# Patient Record
Sex: MALE | Race: WHITE | URBAN - METROPOLITAN AREA
[De-identification: names, ages, dates, MRNs, and addresses within clinical notes are randomized per-mention and may not be internally consistent; named-entity substitution may affect disease eponyms.]

---

## 2023-02-13 ENCOUNTER — EMERGENCY (EMERGENCY)
Facility: HOSPITAL | Age: 28
LOS: 0 days | Discharge: HOME | End: 2023-02-13
Attending: EMERGENCY MEDICINE
Payer: COMMERCIAL

## 2023-02-13 VITALS
TEMPERATURE: 100 F | HEART RATE: 101 BPM | OXYGEN SATURATION: 96 % | RESPIRATION RATE: 17 BRPM | DIASTOLIC BLOOD PRESSURE: 91 MMHG | SYSTOLIC BLOOD PRESSURE: 124 MMHG

## 2023-02-13 VITALS — WEIGHT: 154.98 LBS | HEIGHT: 70 IN

## 2023-02-13 DIAGNOSIS — S02.632A: ICD-10-CM

## 2023-02-13 DIAGNOSIS — S32.502A UNSPECIFIED FRACTURE OF LEFT PUBIS, INITIAL ENCOUNTER FOR CLOSED FRACTURE: ICD-10-CM
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
## 2023-02-13 DIAGNOSIS — R68.84 JAW PAIN: ICD-10-CM

## 2023-02-13 PROCEDURE — 99284 EMERGENCY DEPT VISIT MOD MDM: CPT

## 2023-02-13 PROCEDURE — 99284 EMERGENCY DEPT VISIT MOD MDM: CPT | Mod: 25

## 2023-02-13 PROCEDURE — G1004: CPT

## 2023-02-13 PROCEDURE — 76376 3D RENDER W/INTRP POSTPROCES: CPT | Mod: 26

## 2023-02-13 PROCEDURE — 70486 CT MAXILLOFACIAL W/O DYE: CPT | Mod: MG

## 2023-02-13 PROCEDURE — 76376 3D RENDER W/INTRP POSTPROCES: CPT

## 2023-02-13 PROCEDURE — 70486 CT MAXILLOFACIAL W/O DYE: CPT | Mod: 26,MG
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
## 2023-02-13 NOTE — ED STATDOCS - ATTENDING APP SHARED VISIT CONTRIBUTION OF CARE
I personally saw the patient with the JASWINDER, and completed the key components of the history and physical exam. I then discussed the management plan with the JASWINDER.

## 2023-02-13 NOTE — ED ADULT NURSE NOTE - HOW OFTEN DO YOU HAVE A DRINK CONTAINING ALCOHOL?
PT came to [place] location to receive the MENINGOCOCCAL CONJUGATE MCV4O VACC  But we are currently out of stock. Advise and follow up with PT.   Monthly or less
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
## 2023-02-13 NOTE — ED STATDOCS - PATIENT PORTAL LINK FT
You can access the FollowMyHealth Patient Portal offered by [facility] by registering at the following website: [email or web address]. By joining [employer]’s FollowMyHealth portal, you will also be able to view your health information using other applications (apps) compatible with our system.

## 2023-02-13 NOTE — ED STATDOCS - NS ED ATTENDING STATEMENT MOD
This was a shared visit with the JASWINDER. I reviewed and verified the documentation and independently performed the documented:

## 2023-02-13 NOTE — ED ADULT NURSE REASSESSMENT NOTE - NS ED NURSE REASSESS COMMENT FT1
pt refusing transfer to [facility] pt states they just want to go home. pt refusing medical care despite pt education. PA aware and speaking with pt and mother.

## 2023-02-13 NOTE — ED STATDOCS - CARE PROVIDER_API CALL
Jose Antonio Henry (DDS; MD)  OralMaxillofacial Surgery  [address] [place] [address], [address]  [place] [address]  Phone: [phone]  Fax: [phone]  Follow Up Time:
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
## 2023-02-13 NOTE — ED STATDOCS - NSFOLLOWUPINSTRUCTIONS_ED_ALL_ED_FT
FOLLOW UP WITH YOUR SURGEON AS SOON AS POSSIBLE. RETURN TO ER FOR ANY WORSENING SYMPTOMS OR NEW CONCERNS.     Mandibular Fracture       A mandibular fracture is a break in the lower bone of the jaw (mandible). Mandibular fractures may be mild, moderate, or severe. Mild breaks may heal on their own, and more severe breaks may require surgery.      What are the causes?    The most common cause of this injury is a hard, direct hit (trauma) to your jaw. This can happen from:  •A motor vehicle accident.      •Physical violence, such as a punch or strike to your mouth or face.      •A fall from a high place.        What are the signs or symptoms?    Symptoms of this condition include:  •Pain.      •Swelling or redness.      •Numbness or bruising.      •Difficulty closing your mouth.      •Feeling that your upper teeth and lower teeth do not line up when you close your mouth (malocclusion).      •Difficulty speaking.      •Trouble swallowing.        How is this diagnosed?    This condition may be diagnosed with a medical history and physical exam. You may also have imaging tests, such as an X-ray or a CT scan.      How is this treated?    This condition may be treated with:  •Rest. If your fracture is mild, resting your jaw may be the only treatment needed.      •Surgery to put the mandible back in the right position. During surgery, wires may be placed around the teeth to hold the jaw in place while it heals.      •Medicine for pain.      •Ice being put on your jaw. This can help with pain and swelling.      •Soft foods or a liquid diet.        Follow these instructions at home:    Medicines     •Take over-the-counter and prescription medicines only as told by your health care provider.    •Ask your health care provider if the medicine prescribed to you:  •Requires you to avoid driving or using machinery.    •Can cause constipation. You may need to take these actions to prevent or treat constipation:  •Drink enough fluid to keep your urine pale yellow.      •Take over-the-counter or prescription medicines.      •Eat foods that are high in fiber, such as beans, whole grains, and fresh fruits and vegetables.      •Limit foods that are high in fat and processed sugars, such as fried or sweet foods.            Eating and drinking      •Eat a balanced diet of soft foods or liquids that are high in protein. Follow your health care provider's instructions.    •Follow instructions from your health care provider about eating or drinking restrictions while your jaw is healing. These may include eating:  •Soft foods.      •Liquefied foods.      •Food that has been cut into small pieces.          Managing pain, stiffness, and swelling      If directed, put ice on the injured area. To do this:  •Put ice in a plastic bag.      •Place a towel between your skin and the bag.      •Leave the ice on for 20 minutes, 2–3 times a day.      Activity     •Rest your jaw as told by your health care provider.      •Avoid exercising to the point that you become short of breath.      •Return to your normal activities as told by your health care provider. Ask your health care provider what activities are safe for you.      General instructions     •Sleep on your back to avoid putting pressure on your jaw while your jaw heals.      • Do not use any products that contain nicotine or tobacco, such as cigarettes, e-cigarettes, and chewing tobacco. These can delay bone healing. If you need help quitting, ask your health care provider.      •Keep all follow-up visits as told by your health care provider. This is important.        Contact a health care provider if:    •You have a severe headache.      •You have more numbness of the face.      •You have jaw pain that is severe and does not lessen with medicine.      •You have nausea or anxiety that you cannot control.      •You cannot eat or drink without vomiting.      •Your swelling or redness gets worse.      •You have a fever.        Get help right away if:    •You have difficulty breathing.      •You feel like your windpipe (trachea) is tightening.      •You cannot swallow your saliva.      •You make whistling sounds when you breathe (wheeze).      These symptoms may represent a serious problem that is an emergency. Do not wait to see if the symptoms will go away. Get medical help right away. Call your local emergency services (911 in the [place] Do not drive yourself to the hospital.       Summary    •A mandibular fracture is a break in the lower jawbone (mandible).      •The most common cause of this injury is a hard, direct hit (trauma) to the jaw.      •A mandibular fracture may be treated with rest, surgery, medicines, ice, and a soft or liquid diet.      This information is not intended to replace advice given to you by your health care provider. Make sure you discuss any questions you have with your health care provider.      Document Revised: 09/23/2020 Document Reviewed: 09/23/2020    Elsevier Patient Education © 2022 [employer]

## 2023-02-13 NOTE — ED STATDOCS - PROGRESS NOTE DETAILS
signed Felicia Vila PA-C   I spoke to [facility] Dr Henry via transfer center. May transfer pt to [facility] ER for eval by [facility] tonight. I spoke to ER Dr Liu who accepts transfer. Pt and family agree with transfer and plan of care. signed Felicia Vila PA-C   I spoke to [facility] Dr Henry via transfer center. May transfer pt to [facility] ER for eval by [facility] tonight. I spoke to ER Dr Liu who accepts transfer. signed Felicia Vila PA-C  I spoke to pt and mother. They are concerned that there is not guarantee that pt will have surgery/repair tonight or even tomorrow. They would like to go home and f/u as outpt with their [facility] for surgery next week.
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
